# Patient Record
Sex: MALE | Race: WHITE | NOT HISPANIC OR LATINO | Employment: OTHER | URBAN - METROPOLITAN AREA
[De-identification: names, ages, dates, MRNs, and addresses within clinical notes are randomized per-mention and may not be internally consistent; named-entity substitution may affect disease eponyms.]

---

## 2018-07-09 ENCOUNTER — OFFICE VISIT (OUTPATIENT)
Dept: UROLOGY | Facility: MEDICAL CENTER | Age: 68
End: 2018-07-09
Payer: MEDICARE

## 2018-07-09 VITALS
HEIGHT: 71 IN | BODY MASS INDEX: 32.48 KG/M2 | WEIGHT: 232 LBS | DIASTOLIC BLOOD PRESSURE: 76 MMHG | SYSTOLIC BLOOD PRESSURE: 114 MMHG

## 2018-07-09 DIAGNOSIS — R39.15 BENIGN PROSTATIC HYPERPLASIA (BPH) WITH URINARY URGENCY: ICD-10-CM

## 2018-07-09 DIAGNOSIS — N32.81 OVERACTIVE BLADDER: Primary | ICD-10-CM

## 2018-07-09 DIAGNOSIS — N40.1 BENIGN PROSTATIC HYPERPLASIA (BPH) WITH URINARY URGENCY: ICD-10-CM

## 2018-07-09 LAB
SL AMB  POCT GLUCOSE, UA: NEGATIVE
SL AMB LEUKOCYTE ESTERASE,UA: NORMAL
SL AMB POCT BILIRUBIN,UA: NEGATIVE
SL AMB POCT BLOOD,UA: NEGATIVE
SL AMB POCT CLARITY,UA: CLEAR
SL AMB POCT COLOR,UA: YELLOW
SL AMB POCT KETONES,UA: NEGATIVE
SL AMB POCT NITRITE,UA: NEGATIVE
SL AMB POCT PH,UA: 7
SL AMB POCT SPECIFIC GRAVITY,UA: 1.01
SL AMB POCT URINE PROTEIN: NEGATIVE
SL AMB POCT UROBILINOGEN: 0.2

## 2018-07-09 PROCEDURE — 99214 OFFICE O/P EST MOD 30 MIN: CPT | Performed by: UROLOGY

## 2018-07-09 PROCEDURE — 81003 URINALYSIS AUTO W/O SCOPE: CPT | Performed by: UROLOGY

## 2018-07-09 RX ORDER — TERAZOSIN 2 MG/1
1 CAPSULE ORAL DAILY
COMMUNITY
Start: 2017-07-10

## 2018-07-09 RX ORDER — SIMVASTATIN 20 MG
TABLET ORAL DAILY
COMMUNITY
Start: 2018-06-12

## 2018-07-09 RX ORDER — OXYBUTYNIN CHLORIDE 5 MG/1
5 TABLET ORAL 3 TIMES DAILY
Qty: 90 TABLET | Refills: 3 | Status: SHIPPED | OUTPATIENT
Start: 2018-07-09 | End: 2018-10-05 | Stop reason: SDUPTHER

## 2018-07-09 RX ORDER — DIPHENOXYLATE HYDROCHLORIDE AND ATROPINE SULFATE 2.5; .025 MG/1; MG/1
1 TABLET ORAL DAILY
COMMUNITY

## 2018-07-09 NOTE — PROGRESS NOTES
Assessment/Plan:    Overactive bladder    We will try a higher dose of oxybutynin to see if this will relieve his voiding symptoms  Diagnoses and all orders for this visit:    Overactive bladder  -     oxybutynin (DITROPAN) 5 mg tablet; Take 1 tablet (5 mg total) by mouth 3 (three) times a day    Benign prostatic hyperplasia (BPH) with urinary urgency  -     POCT urine dip auto non-scope  -     PSA, Total Screen; Future    Other orders  -     simvastatin (ZOCOR) 20 mg tablet; daily  -     terazosin (HYTRIN) 2 mg capsule; Take 1 capsule by mouth daily  -     Omega-3 Fatty Acids (FISH OIL PO); Take by mouth  -     multivitamin (THERAGRAN) TABS; Take 1 tablet by mouth daily  -     psyllium (METAMUCIL) 58 6 % packet; Take 1 packet by mouth daily  -     IRON PO; Take by mouth as needed          Subjective:      Patient ID: Luz Catherine is a 77 y o  male  HPI  BPH:   He notes urinary urgency, nocturia x 3  He denies other significant urinary symptoms  He denies hematuria or urinary tract infections  He is taking  oxybutynin for his symptoms  OAB:  Cysto in June 2017 showed mild prostatic obstruction and no bladder changes of obstruction  The patient tried oxybutynin 5 mg in the past without much relief  We will try it again at a higher dose  PSA on March 13, 2018 was 2 07  The following portions of the patient's history were reviewed and updated as appropriate: allergies, current medications, past family history, past medical history, past social history, past surgical history and problem list     Review of Systems   Constitutional: Negative for activity change and fatigue  Respiratory: Negative for shortness of breath and wheezing  Cardiovascular: Negative for chest pain  Gastrointestinal: Negative for abdominal pain  Genitourinary: Negative for difficulty urinating, dysuria, frequency, hematuria and urgency  Musculoskeletal: Negative for back pain and gait problem     Skin: Negative  Allergic/Immunologic: Negative  Neurological: Negative  Psychiatric/Behavioral: Negative  Objective:      /76 (BP Location: Left arm, Patient Position: Sitting, Cuff Size: Standard)   Ht 5' 11" (1 803 m)   Wt 105 kg (232 lb)   BMI 32 36 kg/m²          Physical Exam   Constitutional: He is oriented to person, place, and time  He appears well-developed and well-nourished  HENT:   Head: Normocephalic and atraumatic  Eyes: EOM are normal    Neck: Normal range of motion  Neck supple  Pulmonary/Chest: Effort normal    Genitourinary: Rectum normal    Genitourinary Comments: Prostate about 40 gm, smooth and firm  Musculoskeletal: Normal range of motion  Neurological: He is alert and oriented to person, place, and time  Skin: Skin is warm and dry  Psychiatric: He has a normal mood and affect   His behavior is normal  Judgment and thought content normal

## 2018-07-09 NOTE — LETTER
July 9, 2018     [address] [place] [phone]    Patient: Rosy Schmid   YOB: 1950   Date of Visit: 7/9/2018       Dear Dr Deandre Lomax: Thank you for referring Rosy Schmid to me for evaluation  Below are my notes for this consultation  If you have questions, please do not hesitate to call me  I look forward to following your patient along with you  Sincerely,        Guillermo Chin MD        CC: No Recipients  Guillermo Chin MD  7/9/2018  3:27 PM  Sign at close encounter  Assessment/Plan:    Overactive bladder    We will try a higher dose of oxybutynin to see if this will relieve his voiding symptoms  Diagnoses and all orders for this visit:    Overactive bladder  -     oxybutynin (DITROPAN) 5 mg tablet; Take 1 tablet (5 mg total) by mouth 3 (three) times a day    Benign prostatic hyperplasia (BPH) with urinary urgency  -     POCT urine dip auto non-scope  -     PSA, Total Screen; Future    Other orders  -     simvastatin (ZOCOR) 20 mg tablet; daily  -     terazosin (HYTRIN) 2 mg capsule; Take 1 capsule by mouth daily  -     Omega-3 Fatty Acids (FISH OIL PO); Take by mouth  -     multivitamin (THERAGRAN) TABS; Take 1 tablet by mouth daily  -     psyllium (METAMUCIL) 58 6 % packet; Take 1 packet by mouth daily  -     IRON PO; Take by mouth as needed          Subjective:      Patient ID: Rosy Schmid is a 77 y o  male  HPI  BPH:   He notes urinary urgency, nocturia x 3  He denies other significant urinary symptoms  He denies hematuria or urinary tract infections  He is taking  oxybutynin for his symptoms  OAB:  Cysto in June 2017 showed mild prostatic obstruction and no bladder changes of obstruction  The patient tried oxybutynin 5 mg in the past without much relief  We will try it again at a higher dose  PSA on March 13, 2018 was 2 07        The following portions of the patient's history were reviewed and updated as appropriate: allergies, current medications, past family history, past medical history, past social history, past surgical history and problem list     Review of Systems   Constitutional: Negative for activity change and fatigue  Respiratory: Negative for shortness of breath and wheezing  Cardiovascular: Negative for chest pain  Gastrointestinal: Negative for abdominal pain  Genitourinary: Negative for difficulty urinating, dysuria, frequency, hematuria and urgency  Musculoskeletal: Negative for back pain and gait problem  Skin: Negative  Allergic/Immunologic: Negative  Neurological: Negative  Psychiatric/Behavioral: Negative  Objective:      /76 (BP Location: Left arm, Patient Position: Sitting, Cuff Size: Standard)   Ht 5' 11" (1 803 m)   Wt 105 kg (232 lb)   BMI 32 36 kg/m²           Physical Exam   Constitutional: He is oriented to person, place, and time  He appears well-developed and well-nourished  HENT:   Head: Normocephalic and atraumatic  Eyes: EOM are normal    Neck: Normal range of motion  Neck supple  Pulmonary/Chest: Effort normal    Genitourinary: Rectum normal    Genitourinary Comments: Prostate about 40 gm, smooth and firm  Musculoskeletal: Normal range of motion  Neurological: He is alert and oriented to person, place, and time  Skin: Skin is warm and dry  Psychiatric: He has a normal mood and affect   His behavior is normal  Judgment and thought content normal

## 2018-07-09 NOTE — PROGRESS NOTES
IPSS Questionnaire (AUA-7): Over the past month    1)  How often have you had a sensation of not emptying your bladder completely after you finish urinating? 2 - Less than half the time   2)  How often have you had to urinate again less than two hours after you finished urinating? 4 - More than half the time   3)  How often have you found you stopped and started again several times when you urinated? 1 - Less than 1 time in 5   4) How difficult have you found it to postpone urination? 5 - Almost always   5) How often have you had a weak urinary stream?  1 - Less than 1 time in 5   6) How often have you had to push or strain to begin urination? 1 - Less than 1 time in 5   7) How many times did you most typically get up to urinate from the time you went to bed until the time you got up in the morning? 3 - 3 times   Total Score:  17     QOL: Unhappy

## 2018-10-05 DIAGNOSIS — N32.81 OVERACTIVE BLADDER: ICD-10-CM

## 2018-10-05 NOTE — TELEPHONE ENCOUNTER
Patient called requesting refill on Oxybutynin IR 5mg  Patient working successfully with this medication but he only really takes two pills per day, NOT the previously ordered TID dosing    Request for same, 90 day supply (180 tablets) with 3 refills was queued and forwarded to Dr Rut Rodas for approval

## 2018-10-06 RX ORDER — OXYBUTYNIN CHLORIDE 5 MG/1
5 TABLET ORAL 2 TIMES DAILY
Qty: 180 TABLET | Refills: 3 | Status: SHIPPED | OUTPATIENT
Start: 2018-10-06 | End: 2020-03-26

## 2019-07-11 DIAGNOSIS — R39.15 BENIGN PROSTATIC HYPERPLASIA (BPH) WITH URINARY URGENCY: Primary | ICD-10-CM

## 2019-07-11 DIAGNOSIS — N40.1 BENIGN PROSTATIC HYPERPLASIA (BPH) WITH URINARY URGENCY: Primary | ICD-10-CM

## 2020-03-25 DIAGNOSIS — N32.81 OVERACTIVE BLADDER: ICD-10-CM

## 2020-03-26 RX ORDER — OXYBUTYNIN CHLORIDE 5 MG/1
TABLET ORAL
Qty: 180 TABLET | Refills: 3 | Status: SHIPPED | OUTPATIENT
Start: 2020-03-26

## 2020-10-05 ENCOUNTER — TELEPHONE (OUTPATIENT)
Dept: INTERNAL MEDICINE CLINIC | Facility: CLINIC | Age: 70
End: 2020-10-05

## 2022-06-17 ENCOUNTER — CONSULT (OUTPATIENT)
Dept: PULMONOLOGY | Facility: CLINIC | Age: 72
End: 2022-06-17
Payer: MEDICARE

## 2022-06-17 VITALS
OXYGEN SATURATION: 95 % | TEMPERATURE: 98.3 F | HEIGHT: 71 IN | HEART RATE: 92 BPM | BODY MASS INDEX: 33.18 KG/M2 | DIASTOLIC BLOOD PRESSURE: 70 MMHG | WEIGHT: 237 LBS | SYSTOLIC BLOOD PRESSURE: 124 MMHG

## 2022-06-17 DIAGNOSIS — J45.40 MODERATE PERSISTENT ASTHMA WITHOUT COMPLICATION: Primary | ICD-10-CM

## 2022-06-17 DIAGNOSIS — J30.89 NON-SEASONAL ALLERGIC RHINITIS, UNSPECIFIED TRIGGER: ICD-10-CM

## 2022-06-17 DIAGNOSIS — E66.09 CLASS 1 OBESITY DUE TO EXCESS CALORIES WITHOUT SERIOUS COMORBIDITY WITH BODY MASS INDEX (BMI) OF 33.0 TO 33.9 IN ADULT: ICD-10-CM

## 2022-06-17 DIAGNOSIS — R05.9 COUGH: ICD-10-CM

## 2022-06-17 PROCEDURE — 99204 OFFICE O/P NEW MOD 45 MIN: CPT | Performed by: INTERNAL MEDICINE

## 2022-06-17 RX ORDER — BUDESONIDE 0.5 MG/2ML
0.5 INHALANT ORAL 2 TIMES DAILY
Qty: 120 ML | Refills: 5 | Status: SHIPPED | OUTPATIENT
Start: 2022-06-17 | End: 2022-06-20 | Stop reason: SDUPTHER

## 2022-06-17 RX ORDER — BUDESONIDE AND FORMOTEROL FUMARATE DIHYDRATE 80; 4.5 UG/1; UG/1
2 AEROSOL RESPIRATORY (INHALATION) 2 TIMES DAILY
COMMUNITY

## 2022-06-17 RX ORDER — ALBUTEROL SULFATE 90 UG/1
2 AEROSOL, METERED RESPIRATORY (INHALATION) EVERY 6 HOURS PRN
COMMUNITY

## 2022-06-17 RX ORDER — ALBUTEROL SULFATE 2.5 MG/3ML
2.5 SOLUTION RESPIRATORY (INHALATION) EVERY 6 HOURS PRN
Qty: 360 ML | Refills: 5 | Status: SHIPPED | OUTPATIENT
Start: 2022-06-17 | End: 2022-06-20 | Stop reason: SDUPTHER

## 2022-06-17 NOTE — PROGRESS NOTES
Pulmonary Consultation   Reanna Jackson 74 y o  male MRN: [id number]    Encounter: [id number]      Reason for consultation:   Cough    Requesting physician:  Kristal Shearer DO       Impressions:   · Bronchial asthma  · Cough  · Allergic rhinitis  · Obesity  Recommendations:  · Nebulizer machine  · Budesonide 0 5 mg nebulized twice a day  · Albuterol nebulizer treatments 4 times a day as needed  · Hold the Symbicort  · Follow-up in 2 months  Discussion:  The patient's cough is due to uncontrolled asthma  His pulmonary function test done at [facility] shows evidence of bronchial asthma with reversibility to the bronchodilators  His cough is not allowing him to use the Symbicort appropriately as he cannot hold his breath  For this reason I have started him on budesonide 0 5 mg nebulized twice a day  I have ordered a nebulizer machine  Also ordered albuterol solution for the nebulizer to be used 4 times a day as needed  I told him to hold the Symbicort what he is on the nebulizer treatment  After 2 weeks if his cough is controlled he may resume the Symbicort  I told him to use 2 inhalations twice a day  I have provided him with a spacer so he can use it with the Symbicort inhaler for better efficiency  I will see him in about 2 months  History of Present Illness   HPI:  Reanna Jackson is a 74 y o  male who is here for evaluation of cough  The patient stated that he had a chronic cough which has worsened over time  He was evaluated in the past by [facility]  He was placed on Symbicort which he has been taking twice a day  It is 80/4 5  He had episodes of severe cough to the point where he passed out  The patient is fairly active  He mowed the lawn for 3 hours earlier today in the morning  Denies chest pain  Denies heartburn or postnasal dripping      Review of systems:  12 point review of systems was completed and was otherwise negative except as listed in HPI  Historical Information   Past Medical History:   Diagnosis Date    Abdominal pain     Anemia     Bladder trabeculation     BPH with obstruction/lower urinary tract symptoms     Elevated PSA     Kidney stone     Urinary incontinence      Past Surgical History:   Procedure Laterality Date    CYSTOSCOPY  ,     CYSTOSCOPY W/ STONE MANIPULATION  ,     PROSTATE SURGERY      SKIN BIOPSY  2016, 2018     Family History   Problem Relation Age of Onset    Lung cancer Father        Family History:  His father  of lung cancer  He was a heavy smoker  Social History:  The patient is  and lives with his wife  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 [employer]  Lifelong nonsmoker  Meds/Allergies   No current facility-administered medications for this visit  (Not in a hospital admission)    Allergies   Allergen Reactions    Advil [Ibuprofen]        Vitals: Blood pressure 124/70, pulse 92, temperature 98 3 °F (36 8 °C), temperature source Tympanic, height 5' 11" (1 803 m), weight 108 kg (237 lb), SpO2 95 %  ,      Physical exam:        Head/eyes:    Normocephalic, without obvious abnormality, atraumatic,         PERRL, extraocular muscles intact, no scleral icterus    Nose:   Nares normal, septum midline, mucosa normal, no drainage    or sinus tenderness   Throat:   Moist mucous membranes, no thrush   Neck:   Supple, trachea midline, no adenopathy; no carotid    bruit or JVD   Lungs:     Clear breath sounds  No wheezing or rhonchi          Heart:    Regular rate and rhythm, S1 and S2 normal, no murmur, rub   or gallop   Abdomen:     Soft, non-tender, bowel sounds active all four quadrants,     no masses, no organomegaly   Extremities:   Extremities normal, atraumatic, no cyanosis or edema   Skin:   Warm, dry, turgor normal, no rashes or lesions   Neurologic:   CNII-XII intact, normal strength, non-focal             Imaging and other studies: I have personally reviewed pertinent reports  Chest x-ray report from the [facility] is reviewed  No acute pulmonary findings  PFTs done in the past at [facility]   The report indicates that the patient had moderate airflow limitation with significant improvement to the bronchodilators  Ref Range & Units 6/3/22 10:50 AM    Hemoglobin 12 5 - 17 0 g/dL 13 4    Hematocrit 37 0 - 48 0 % 40 4    WBC 4 0 - 10 5 thou/cmm 4 7    RBC 4 00 - 5 40 mill/cmm 4 34    Platelet Count 429 - 350 thou/cmm 207    MPV 7 5 - 11 3 fL 9 1    MCV 80 - 100 fL 93    MCH 27 0 - 36 0 pg 30 9    MCHC 32 0 - 37 0 g/dL 33 1    RDW 12 0 - 16 0 % 14 1    Differential Type  AUTO    Absolute Neutrophils 1 8 - 7 8 thou/cmm 2 9    Absolute Lymphocytes 1 0 - 3 0 thou/cmm 0 8 Low     Absolute Monocytes 0 3 - 1 0 thou/cmm 0 7    Absolute Eosinophils 0 0 - 0 5 thou/cmm 0 2    Absolute Basophils 0 0 - 0 1 thou/cmm 0 0    Neutrophils % 62    Lymphocytes % 17    Monocytes % 15    Eosinophils % 5    Basophils % 1      Ref Range & Units 4/15/22  9:12 AM    Glucose 65 - 99 mg/dL 111 High     BUN 7 - 28 mg/dL 14    Creatinine 0 53 - 1 30 mg/dL 1 04    Sodium 135 - 145 mmol/L 139    Potassium 3 5 - 5 2 mmol/L 4 6    Chloride 100 - 109 mmol/L 105    Carbon Dioxide 23 - 31 mmol/L 27    Calcium 8 5 - 10 1 mg/dL 9 2    Alkaline Phosphatase 35 - 120 U/L 46    Albumin 3 5 - 4 8 g/dL 3 7    Bilirubin, Total 0 2 - 1 0 mg/dL 0 5    Comment: Use of this assay is not recommended for patients undergoing treatment with eltrombopag due to the potential for falsely elevated results     Protein, Total 6 3 - 8 3 g/dL 6 6    AST <41 U/L 16    ALT <56 U/L 26    Anion Gap 3 - 11 7    eGFRcr >59 75              Génesis Lindsey MD

## 2022-06-18 ENCOUNTER — PATIENT MESSAGE (OUTPATIENT)
Dept: PULMONOLOGY | Facility: CLINIC | Age: 72
End: 2022-06-18

## 2022-06-18 DIAGNOSIS — J45.40 MODERATE PERSISTENT ASTHMA WITHOUT COMPLICATION: ICD-10-CM

## 2022-06-20 ENCOUNTER — TELEPHONE (OUTPATIENT)
Dept: PULMONOLOGY | Facility: CLINIC | Age: 72
End: 2022-06-20

## 2022-06-20 RX ORDER — ALBUTEROL SULFATE 2.5 MG/3ML
2.5 SOLUTION RESPIRATORY (INHALATION) EVERY 6 HOURS PRN
Qty: 360 ML | Refills: 5 | Status: SHIPPED | OUTPATIENT
Start: 2022-06-20 | End: 2022-07-20

## 2022-06-20 RX ORDER — BUDESONIDE 0.5 MG/2ML
0.5 INHALANT ORAL 2 TIMES DAILY
Qty: 120 ML | Refills: 5 | Status: SHIPPED | OUTPATIENT
Start: 2022-06-20 | End: 2022-07-18

## 2022-06-20 NOTE — TELEPHONE ENCOUNTER
Pt was given a Tammi nebulizer SN# [id number] and paperwork was faxed to [facility], along w/ script and office notes of 6/17/22

## 2022-06-21 ENCOUNTER — DOCUMENTATION (OUTPATIENT)
Dept: PULMONOLOGY | Facility: CLINIC | Age: 72
End: 2022-06-21

## 2022-06-21 NOTE — PROGRESS NOTES
Sending disc to [facility] to be uploaded to PACs      05/03/22 - XR chest 2 views (PA & LAT)

## 2022-07-12 DIAGNOSIS — J45.40 MODERATE PERSISTENT ASTHMA WITHOUT COMPLICATION: ICD-10-CM

## 2022-07-14 ENCOUNTER — TELEPHONE (OUTPATIENT)
Dept: PULMONOLOGY | Facility: CLINIC | Age: 72
End: 2022-07-14

## 2022-07-14 NOTE — TELEPHONE ENCOUNTER
LM for patient to give a call back and set up a follow up Appt for Dr Marguerite Reed in  [facility]

## 2022-07-18 RX ORDER — BUDESONIDE 0.5 MG/2ML
0.5 INHALANT ORAL 2 TIMES DAILY
Qty: 360 ML | Refills: 2 | Status: SHIPPED | OUTPATIENT
Start: 2022-07-18 | End: 2022-08-17

## 2022-09-28 ENCOUNTER — OFFICE VISIT (OUTPATIENT)
Dept: PULMONOLOGY | Facility: CLINIC | Age: 72
End: 2022-09-28
Payer: MEDICARE

## 2022-09-28 VITALS
HEIGHT: 71 IN | SYSTOLIC BLOOD PRESSURE: 122 MMHG | DIASTOLIC BLOOD PRESSURE: 70 MMHG | BODY MASS INDEX: 33.04 KG/M2 | WEIGHT: 236 LBS | OXYGEN SATURATION: 96 % | HEART RATE: 90 BPM | RESPIRATION RATE: 18 BRPM

## 2022-09-28 DIAGNOSIS — E66.09 CLASS 1 OBESITY DUE TO EXCESS CALORIES WITHOUT SERIOUS COMORBIDITY WITH BODY MASS INDEX (BMI) OF 33.0 TO 33.9 IN ADULT: ICD-10-CM

## 2022-09-28 DIAGNOSIS — J30.89 NON-SEASONAL ALLERGIC RHINITIS, UNSPECIFIED TRIGGER: ICD-10-CM

## 2022-09-28 DIAGNOSIS — R05.9 COUGH: ICD-10-CM

## 2022-09-28 DIAGNOSIS — U07.1 COVID: ICD-10-CM

## 2022-09-28 DIAGNOSIS — J45.40 MODERATE PERSISTENT ASTHMA WITHOUT COMPLICATION: Primary | ICD-10-CM

## 2022-09-28 PROCEDURE — 99214 OFFICE O/P EST MOD 30 MIN: CPT | Performed by: INTERNAL MEDICINE

## 2022-09-28 RX ORDER — FLUTICASONE PROPIONATE 50 MCG
1 SPRAY, SUSPENSION (ML) NASAL DAILY
Qty: 16 G | Refills: 5 | Status: SHIPPED | OUTPATIENT
Start: 2022-09-28

## 2022-09-28 RX ORDER — BUDESONIDE AND FORMOTEROL FUMARATE DIHYDRATE 80; 4.5 UG/1; UG/1
2 AEROSOL RESPIRATORY (INHALATION) 2 TIMES DAILY
Qty: 10.2 G | Refills: 5 | Status: SHIPPED | OUTPATIENT
Start: 2022-09-28

## 2022-09-28 NOTE — PROGRESS NOTES
Office Progress Note - Pulmonary    Reanna Henry 74 y o  male MRN: [id number]    Encounter: [id number]      Assessment:   Bronchial asthma   Cough  Resolved   Allergic rhinitis   Recent COVID infection   Obesity  Plan:     Hold the budesonide nebulizer treatments   Symbicort 80/4 5, 2 inhalations twice a day   Albuterol rescue inhaler 2 inhalations 4 times a day as needed   Fluticasone nasal spray 2 sprays to each nostril once a day   Follow-up in 6 months  Discussion:   The patient's cough has resolved  His asthma is well controlled  I have transitioned him back to Symbicort 80/4 5, 2 inhalations twice a day  I have renewed his prescriptions  He will use the albuterol rescue inhaler 4 times a day as needed  He has symptomatic postnasal dripping from allergic rhinitis  I have started him on fluticasone nasal spray 2 sprays to each nostril once a day  I will see him in 6 months in a follow-up visit  Subjective: The patient is here for a follow-up visit  His cough has resolved  He is using budesonide nebulizer treatment once a day  He was away and use the Symbicort  He has no nocturnal symptoms  Review of systems:  A 12 point system review is done and aside from what is stated above the rest of the review of systems is negative  Family history and social history are reviewed  Medications list is reviewed  Vitals: Blood pressure 122/70, pulse 90, resp  rate 18, height 5' 11" (1 803 m), weight 107 kg (236 lb), SpO2 96 %  ,     Physical Exam  Gen: Awake, alert, oriented x 3, no acute distress  HEENT: Mucous membranes moist, no oral lesions, no thrush  NECK: No accessory muscle use, JVP not elevated  Cardiac: Regular, single S1, single S2, no murmurs, no rubs, no gallops  Lungs:  Clear breath sounds  No wheezing or rhonchi    Abdomen: normoactive bowel sounds, soft nontender, nondistended, no rebound or rigidity, no guarding  Extremities: no cyanosis, no clubbing, no edema  Neuro:  Grossly nonfocal   Skin:  No rash

## 2022-11-20 DIAGNOSIS — J30.89 NON-SEASONAL ALLERGIC RHINITIS, UNSPECIFIED TRIGGER: ICD-10-CM

## 2022-11-22 RX ORDER — FLUTICASONE PROPIONATE 50 MCG
SPRAY, SUSPENSION (ML) NASAL
Qty: 24 ML | Refills: 4 | Status: SHIPPED | OUTPATIENT
Start: 2022-11-22

## 2022-12-06 DIAGNOSIS — J30.89 NON-SEASONAL ALLERGIC RHINITIS, UNSPECIFIED TRIGGER: ICD-10-CM

## 2022-12-07 RX ORDER — FLUTICASONE PROPIONATE 50 MCG
SPRAY, SUSPENSION (ML) NASAL
Qty: 24 ML | Refills: 4 | Status: SHIPPED | OUTPATIENT
Start: 2022-12-07

## 2023-01-16 ENCOUNTER — TELEPHONE (OUTPATIENT)
Dept: PULMONOLOGY | Facility: CLINIC | Age: 73
End: 2023-01-16

## 2023-01-16 NOTE — TELEPHONE ENCOUNTER
Left a voicemail for patient to schedule a 6 month follow up at our [facility] office with Dr Bernarda Baron or TUCKER in March

## 2023-03-21 ENCOUNTER — OFFICE VISIT (OUTPATIENT)
Dept: PULMONOLOGY | Facility: CLINIC | Age: 73
End: 2023-03-21

## 2023-03-21 VITALS
DIASTOLIC BLOOD PRESSURE: 72 MMHG | OXYGEN SATURATION: 97 % | SYSTOLIC BLOOD PRESSURE: 132 MMHG | HEIGHT: 71 IN | WEIGHT: 241.8 LBS | BODY MASS INDEX: 33.85 KG/M2 | HEART RATE: 63 BPM

## 2023-03-21 DIAGNOSIS — I49.9 IRREGULAR HEART BEAT: ICD-10-CM

## 2023-03-21 DIAGNOSIS — J30.89 NON-SEASONAL ALLERGIC RHINITIS, UNSPECIFIED TRIGGER: ICD-10-CM

## 2023-03-21 DIAGNOSIS — J45.40 MODERATE PERSISTENT ASTHMA WITHOUT COMPLICATION: Primary | ICD-10-CM

## 2023-03-21 DIAGNOSIS — R06.09 DOE (DYSPNEA ON EXERTION): ICD-10-CM

## 2023-03-21 RX ORDER — FLUTICASONE PROPIONATE 50 MCG
2 SPRAY, SUSPENSION (ML) NASAL DAILY
Qty: 16 ML | Refills: 5 | Status: SHIPPED | OUTPATIENT
Start: 2023-03-21

## 2023-03-21 NOTE — PROGRESS NOTES
Office Progress Note - Pulmonary    Lina Quiñones 74 y o  male MRN: [id number]    Encounter: [id number]      Assessment:  • Dyspnea on exertion  • Irregular heartbeat  • Moderate persistent bronchial asthma  • Allergic rhinitis  Plan:   • ECG  • 6-minute walk test   • 2D echo  • Symbicort 80/4 5, 2 inhalations twice a day  • Hold the budesonide nebulizer treatments  • Albuterol rescue inhaler 2 inhalations 4 times daily as needed  • Saline nasal/sinus rinse once a day  • Fluticasone nasal spray 2 sprays to each nostril once a day  Discussion:   The patient's dyspnea on exertion is most likely due to deconditioning and obesity  His 6-minute walk test was unremarkable  I advised the patient to lose weight and start taking regular walks to improve his stamina  The irregular heartbeat is due to premature atrial complexes  He does have ejection systolic murmur and for this reason I have ordered 2D echo  His last echo from 3 years ago showed aortic valve sclerosis  His bronchial asthma is fairly controlled  I told him to use the Symbicort 80/4 5, 2 inhalations twice a day  They told him to stop using the budesonide nebulizer treatments  He will use albuterol rescue inhaler 4 times a day as needed  His allergic rhinitis is not well controlled  I have started him on saline nasal/sinus rinse once a day in the evening  He will use fluticasone nasal spray 2 sprays to each nostril once a day in the morning  I will see him in 2 months  Subjective: The patient is here for a follow-up visit  He has dyspnea on exertion which is more noticeable to him than before  He also has postnasal dripping and coughing  Denies any significant wheezing  He has been using budesonide nebulizer treatments once a day and sometimes alternating it with the Symbicort  No nocturnal symptoms  He has postnasal dripping      Review of systems:  A 12 point system review is done and aside from what is stated above the rest of the review of systems is negative  Family history and social history are reviewed  Medications list is reviewed  Vitals: Blood pressure 132/72, pulse 63, height 5' 11" (1 803 m), weight 110 kg (241 lb 12 8 oz), SpO2 97 %  ,     Physical Exam  Gen: Awake, alert, oriented x 3, no acute distress  HEENT: Mucous membranes moist, no oral lesions, no thrush  NECK: No accessory muscle use, JVP not elevated  Cardiac: Irregular rhythm  2/6 ejection systolic murmur  Lungs: Clear breath sounds  No wheezing or rhonchi  Abdomen: normoactive bowel sounds, soft nontender, nondistended, no rebound or rigidity, no guarding  Extremities: no cyanosis, no clubbing, no edema  Neuro:  Grossly nonfocal   Skin:  No rash  ECG showed sinus rhythm with PACs    6-minute walk test was done in the office today and the patient started with a heart rate of 82 bpm and O2 saturation 96%  At the end of the test the heart rate was 97 bpm and the O2 saturation 96%       Ref Range & Units 10/28/22  8:00 AM   Hemoglobin 12 5 - 17 0 g/dL 13 3    Hematocrit 37 0 - 48 0 % 39 0    WBC 4 0 - 10 5 thou/cmm 4 7    RBC 4 00 - 5 40 mill/cmm 4 18    Platelet Count 891 - 350 thou/cmm 225    MPV 7 5 - 11 3 fL 9 1    MCV 80 - 100 fL 93    MCH 27 0 - 36 0 pg 31 7    MCHC 32 0 - 37 0 g/dL 34 0    RDW 12 0 - 16 0 % 13 7    Differential Type  AUTO    Absolute Neutrophils 1 8 - 7 8 thou/cmm 2 8    Absolute Lymphocytes 1 0 - 3 0 thou/cmm 1 1    Absolute Monocytes 0 3 - 1 0 thou/cmm 0 4    Absolute Eosinophils 0 0 - 0 5 thou/cmm 0 3    Absolute Basophils 0 0 - 0 1 thou/cmm 0 1    Neutrophils % 61    Lymphocytes % 24    Monocytes % 8    Eosinophils % 6       Ref Range & Units 10/28/22  8:00 AM   Glucose 65 - 99 mg/dL 100 High     BUN 7 - 28 mg/dL 12    Creatinine 0 53 - 1 30 mg/dL 1 04    Sodium 135 - 145 mmol/L 140    Potassium 3 5 - 5 2 mmol/L 4 6    Chloride 100 - 109 mmol/L 108    Carbon Dioxide 23 - 31 mmol/L 26    Calcium 8 5 - 10 1 mg/dL 9 4    Alkaline Phosphatase 35 - 120 U/L 46    Albumin 3 5 - 4 8 g/dL 3 6    Bilirubin, Total 0 2 - 1 0 mg/dL 0 6    Comment: Use of this assay is not recommended for patients undergoing treatment with eltrombopag due to the potential for falsely elevated results     Protein, Total 6 3 - 8 3 g/dL 6 4    AST <41 U/L 16    ALT <56 U/L 29    Anion Gap 3 - 11 6    eGFRcr >59 75

## 2023-05-24 ENCOUNTER — OFFICE VISIT (OUTPATIENT)
Dept: PULMONOLOGY | Facility: CLINIC | Age: 73
End: 2023-05-24

## 2023-05-24 VITALS
HEIGHT: 71 IN | DIASTOLIC BLOOD PRESSURE: 74 MMHG | BODY MASS INDEX: 34.44 KG/M2 | WEIGHT: 246 LBS | HEART RATE: 66 BPM | OXYGEN SATURATION: 97 % | SYSTOLIC BLOOD PRESSURE: 126 MMHG | TEMPERATURE: 97.6 F

## 2023-05-24 DIAGNOSIS — J30.89 NON-SEASONAL ALLERGIC RHINITIS, UNSPECIFIED TRIGGER: ICD-10-CM

## 2023-05-24 DIAGNOSIS — J45.40 MODERATE PERSISTENT ASTHMA WITHOUT COMPLICATION: ICD-10-CM

## 2023-05-24 DIAGNOSIS — R06.09 DOE (DYSPNEA ON EXERTION): ICD-10-CM

## 2023-05-24 DIAGNOSIS — E66.09 CLASS 1 OBESITY DUE TO EXCESS CALORIES WITHOUT SERIOUS COMORBIDITY WITH BODY MASS INDEX (BMI) OF 33.0 TO 33.9 IN ADULT: ICD-10-CM

## 2023-05-24 DIAGNOSIS — J45.41 MODERATE PERSISTENT ASTHMA WITH ACUTE EXACERBATION: Primary | ICD-10-CM

## 2023-05-24 RX ORDER — PREDNISONE 10 MG/1
TABLET ORAL
Qty: 45 TABLET | Refills: 0 | Status: SHIPPED | OUTPATIENT
Start: 2023-05-24

## 2023-05-24 RX ORDER — BUDESONIDE 0.5 MG/2ML
0.5 INHALANT ORAL 2 TIMES DAILY
Qty: 120 ML | Refills: 5 | Status: SHIPPED | OUTPATIENT
Start: 2023-05-24 | End: 2023-06-23

## 2023-05-24 NOTE — PROGRESS NOTES
Office Progress Note - Pulmonary    Will Speed 74 y o  male MRN: [id number]    Encounter: [id number]      Assessment:  • Brhonchial asthma with acute exacerbation  • Allergic rhinitis  • Dyspnea on exertion  • Obesity  Plan:   • Prednisone 40 mg p o  daily for 5 days with a taper by 10 mg every 3 days  • Budesonide 0 5 mg nebulized twice a day  • Symbicort 80/4 5, 2 inhalations twice a day  • Albuterol rescue inhaler 2 inhalations 4 times a day as needed  • Weight loss  • Regular exercise  • Follow-up in 6 weeks  Discussion:   The patient's cough is due to asthma acute exacerbation  I have started him on prednisone 40 mg once a day for 5 days with a taper by 10 mg every 3 days  I have restarted his budesonide 0 5 mg nebulized twice a day until cough has resolved  I have maintained him on the Symbicort 80/4 5, 2 inhalations twice a day  He will use the albuterol rescue inhaler 2 inhalations 4 times a day as needed  His shortness of breath on exertion is better  This is mainly due to weight loss and regular exercise  I will encourage him to continue with the same strategy  I will see him in 6 weeks  Subjective: The patient is here for a follow-up visit  He was on a cruise and developed upper respiratory infection  He started having a cough  He was evaluated on the ship and then started on albuterol nebulizer treatments  After he returned home he continued to cough  The cough is dry  It was during the day as well as at night  He restarted the budesonide nebulizer treatments which helped his symptoms  However he still has significant cough  He started to have a regular walk and exercise  His shortness of breath has improved  He denies any chest pain  No fever or chills  Review of systems:  A 12 point system review is done and aside from what is stated above the rest of the review of systems is negative        Family history and social history are "reviewed  Medications list is reviewed  Vitals: Blood pressure 126/74, pulse 66, temperature 97 6 °F (36 4 °C), temperature source Tympanic, height 5' 11\" (1 803 m), weight 112 kg (246 lb), SpO2 97 %  ,     Physical Exam  Gen: Awake, alert, oriented x 3, no acute distress  HEENT: Mucous membranes moist, no oral lesions, no thrush  NECK: No accessory muscle use, JVP not elevated  Cardiac: Regular, single S1, single S2  2/6 ejection systolic murmur  2D echo  Lungs: Clear breath sounds  No wheezing or rhonchi  Abdomen: normoactive bowel sounds, soft nontender, nondistended, no rebound or rigidity, no guarding  Extremities: no cyanosis, no clubbing, no edema  Neuro:  Grossly nonfocal   Skin:  No rash  Ref Range & Units 5/1/23 11:00 AM   Hemoglobin 12 5 - 17 0 g/dL 10 5 Low     Hematocrit 37 0 - 48 0 % 31 7 Low     WBC 4 0 - 10 5 thou/cmm 4 9    RBC 4 00 - 5 40 mill/cmm 3 45 Low     Platelet Count 567 - 350 thou/cmm 332    MPV 7 5 - 11 3 fL 8 2    MCV 80 - 100 fL 92    MCH 27 0 - 36 0 pg 30 3    MCHC 32 0 - 37 0 g/dL 33 0    RDW 12 0 - 16 0 % 14 1    Differential Type  AUTO    Absolute Neutrophils 1 8 - 7 8 thou/cmm 3 1    Absolute Lymphocytes 1 0 - 3 0 thou/cmm 1 0    Absolute Monocytes 0 3 - 1 0 thou/cmm 0 4    Absolute Eosinophils 0 0 - 0 5 thou/cmm 0 2    Absolute Basophils 0 0 - 0 1 thou/cmm 0 1    Neutrophils % 63    Lymphocytes % 21    Monocytes % 9    Eosinophils % 5    Basophils % 2      Ref Range & Units 4/25/23  8:26 AM   Glucose 65 - 99 mg/dL 103 High     BUN 7 - 28 mg/dL 14    Creatinine 0 53 - 1 30 mg/dL 1 11    Sodium 135 - 145 mmol/L 139    Potassium 3 5 - 5 2 mmol/L 4 5    Chloride 100 - 109 mmol/L 104    Carbon Dioxide 21 - 31 mmol/L 24    Calcium 8 5 - 10 1 mg/dL 9 6    Alkaline Phosphatase 35 - 120 U/L 40    Albumin 3 5 - 5 7 g/dL 4 3    Bilirubin, Total 0 2 - 1 0 mg/dL 0 4    Comment: Eltrombopag and its metabolites may interfere with this assay causing erroneously high patient results   " Protein, Total 6 3 - 8 3 g/dL 6 7    AST <41 U/L 19    ALT <56 U/L 18    Anion Gap 3 - 11 11    eGFRcr >59 69      2D echo shows mild aortic stenosis        Answers for HPI/ROS submitted by the patient on 5/23/2023  Do you have a cough?: Yes  Do you have difficulty breathing?: Yes  Do you have shortness of breath?: Yes  Chronicity: recurrent  When did you first notice your symptoms?: more than 1 year ago  How often do your symptoms occur?: intermittently  Since you first noticed this problem, how has it changed?: unchanged  Have you had a change in appetite?: No  Do you have chest pain?: No  Do you have shortness of breath that occurs with effort or exertion?: Yes  Do you have ear congestion?: No  Do you have ear pain?: No  Do you have a fever?: No  Do you have headaches?: No  Do you have heartburn?: No  Do you have fatigue?: No  Do you have muscle pain?: No  Do you have nasal congestion?: No  Do you have shortness of breath when lying flat?: No  Do you have shortness of breath when you wake up?: No  Do you have post-nasal drip?: No  Do you have a runny nose?: Yes  Do you have sneezing?: No  Do you have a sore throat?: No  Do you have sweats?: Yes  Do you have trouble swallowing?: No  Have you experienced weight loss?: No  Which of the following makes your symptoms better?: steroid inhaler  Risk factors for lung disease: occupational exposure

## 2023-06-15 DIAGNOSIS — J45.40 MODERATE PERSISTENT ASTHMA WITHOUT COMPLICATION: ICD-10-CM

## 2023-07-19 ENCOUNTER — OFFICE VISIT (OUTPATIENT)
Dept: PULMONOLOGY | Facility: CLINIC | Age: 73
End: 2023-07-19
Payer: MEDICARE

## 2023-07-19 VITALS
BODY MASS INDEX: 32.9 KG/M2 | HEIGHT: 71 IN | SYSTOLIC BLOOD PRESSURE: 138 MMHG | HEART RATE: 68 BPM | OXYGEN SATURATION: 95 % | DIASTOLIC BLOOD PRESSURE: 78 MMHG | WEIGHT: 235 LBS

## 2023-07-19 DIAGNOSIS — R06.09 DOE (DYSPNEA ON EXERTION): ICD-10-CM

## 2023-07-19 DIAGNOSIS — J30.89 NON-SEASONAL ALLERGIC RHINITIS, UNSPECIFIED TRIGGER: ICD-10-CM

## 2023-07-19 DIAGNOSIS — E66.09 CLASS 1 OBESITY DUE TO EXCESS CALORIES WITHOUT SERIOUS COMORBIDITY WITH BODY MASS INDEX (BMI) OF 33.0 TO 33.9 IN ADULT: ICD-10-CM

## 2023-07-19 DIAGNOSIS — J45.40 MODERATE PERSISTENT ASTHMA WITHOUT COMPLICATION: Primary | ICD-10-CM

## 2023-07-19 PROCEDURE — 99214 OFFICE O/P EST MOD 30 MIN: CPT | Performed by: INTERNAL MEDICINE

## 2023-07-19 PROCEDURE — 94010 BREATHING CAPACITY TEST: CPT

## 2023-07-19 RX ORDER — FLUTICASONE FUROATE AND VILANTEROL 200; 25 UG/1; UG/1
1 POWDER RESPIRATORY (INHALATION) DAILY
Qty: 60 BLISTER | Refills: 5 | Status: SHIPPED | OUTPATIENT
Start: 2023-07-19 | End: 2023-07-19

## 2023-07-19 RX ORDER — FLUTICASONE FUROATE AND VILANTEROL 200; 25 UG/1; UG/1
1 POWDER RESPIRATORY (INHALATION) DAILY
Qty: 60 BLISTER | Refills: 5 | Status: SHIPPED | OUTPATIENT
Start: 2023-07-19 | End: 2023-08-18

## 2023-07-19 NOTE — PROGRESS NOTES
Office Progress Note - Pulmonary    Cecilia Larsen 74 y.o. male MRN: [id number]    Encounter: [id number]      Assessment:  • Bronchial asthma. • Allergic rhinitis. • Dyspnea on exertion. • Obesity. Plan:   • Spirometry. • Discontinue Symbicort. • Breo Ellipta 200/25, 1 inhalation once a day. • Hold the budesonide nebulizer treatments. • Fluticasone nasal spray 2 sprays to each nostril once a day. • Albuterol rescue inhaler 2 inhalations 4 times a day as needed. • Follow-up in 3 months    Discussion:   The patient's asthma is very well controlled. I have held the budesonide nebulizer treatments. I have transitioned him from Symbicort to Breo 200/25, 1 inhalation once a day. He will use the albuterol rescue inhaler 2 inhalations 4 times a day as needed. His allergic rhinitis is well treated. I have maintained him on the fluticasone nasal spray 2 sprays to each nostril once a day. His spirometry today showed restrictive pattern. I will see him in 3 months and a follow-up visit. Subjective: The patient is here for a follow-up visit. His cough has resolved. Denies any significant wheezing or chest tightness. He has been using the Symbicort 80/4.5, 2 inhalations twice a day and budesonide once a day. He did not need to take the prednisone that I have prescribed the last time. Review of systems:  A 12 point system review is done and aside from what is stated above the rest of the review of systems is negative. Family history and social history are reviewed. Medications list is reviewed. Vitals: Blood pressure 138/78, pulse 68, height 5' 11" (1.803 m), weight 107 kg (235 lb), SpO2 95 %. ,     Physical Exam  Gen: Awake, alert, oriented x 3, no acute distress  HEENT: Mucous membranes moist, no oral lesions, no thrush  NECK: No accessory muscle use, JVP not elevated  Cardiac: Regular, single S1, single S2, no murmurs, no rubs, no gallops  Lungs: Decreased breath sounds.   No wheezing or rhonchi. Abdomen: normoactive bowel sounds, soft nontender, nondistended, no rebound or rigidity, no guarding  Extremities: no cyanosis, no clubbing, no edema  Neuro:  Grossly nonfocal.  Skin:  No rash. Spirometry done today in the office. It showed restrictive pattern. No airflow limitation.      Ref Range & Units 7/1/23  8:27 AM   Hemoglobin 12.5 - 17.0 g/dL 13.4    Hematocrit 37.0 - 48.0 % 40.3    WBC 4.0 - 10.5 thou/cmm 4.7    RBC 4.00 - 5.40 mill/cmm 4.46    Platelet Count 148 - 350 thou/cmm 220    MPV 7.5 - 11.3 fL 8.5    MCV 80 - 100 fL 91    MCH 27.0 - 36.0 pg 30.0    MCHC 32.0 - 37.0 g/dL 33.2    RDW 12.0 - 16.0 % 17.3 High     Differential Type  AUTO    Absolute Neutrophils 1.8 - 7.8 thou/cmm 2.9    Absolute Lymphocytes 1.0 - 3.0 thou/cmm 1.2    Absolute Monocytes 0.3 - 1.0 thou/cmm 0.4    Absolute Eosinophils 0.0 - 0.5 thou/cmm 0.3    Absolute Basophils 0.0 - 0.1 thou/cmm 0.0    Neutrophils % 60    Lymphocytes % 24    Monocytes % 8    Eosinophils % 7    Basophils % 1

## 2023-08-01 RX ORDER — BUDESONIDE 0.5 MG/2ML
INHALANT ORAL
Qty: 360 ML | Refills: 2 | Status: SHIPPED | OUTPATIENT
Start: 2023-08-01

## 2023-09-19 PROBLEM — Z98.890 HX OF COLONOSCOPY: Status: ACTIVE | Noted: 2023-08-16

## 2023-10-05 ENCOUNTER — OFFICE VISIT (OUTPATIENT)
Dept: PULMONOLOGY | Facility: CLINIC | Age: 73
End: 2023-10-05
Payer: MEDICARE

## 2023-10-05 VITALS
SYSTOLIC BLOOD PRESSURE: 122 MMHG | WEIGHT: 236.6 LBS | OXYGEN SATURATION: 95 % | RESPIRATION RATE: 18 BRPM | HEIGHT: 71 IN | BODY MASS INDEX: 33.12 KG/M2 | DIASTOLIC BLOOD PRESSURE: 74 MMHG | HEART RATE: 64 BPM

## 2023-10-05 DIAGNOSIS — J30.89 SEASONAL ALLERGIC RHINITIS DUE TO OTHER ALLERGIC TRIGGER: Primary | ICD-10-CM

## 2023-10-05 DIAGNOSIS — E66.9 CLASS 1 OBESITY WITHOUT SERIOUS COMORBIDITY WITH BODY MASS INDEX (BMI) OF 33.0 TO 33.9 IN ADULT, UNSPECIFIED OBESITY TYPE: ICD-10-CM

## 2023-10-05 DIAGNOSIS — J45.20 MILD INTERMITTENT ASTHMA WITHOUT COMPLICATION: ICD-10-CM

## 2023-10-05 PROBLEM — E66.811 CLASS 1 OBESITY WITHOUT SERIOUS COMORBIDITY WITH BODY MASS INDEX (BMI) OF 33.0 TO 33.9 IN ADULT: Status: ACTIVE | Noted: 2023-10-05

## 2023-10-05 PROBLEM — J30.9 ALLERGIC RHINITIS: Status: ACTIVE | Noted: 2023-10-05

## 2023-10-05 PROCEDURE — 99214 OFFICE O/P EST MOD 30 MIN: CPT | Performed by: INTERNAL MEDICINE

## 2023-10-05 NOTE — PROGRESS NOTES
Pulmonary Follow Up Note   Mercedes Kruse 74 y.o. male MRN: [id number]  10/5/2023      Assessment:  Bronchial asthma. Allergic rhinitis. Dyspnea on exertion. Obesity. Plan:  · Continue Symbicort as needed as per IVAN asthma guidelines as his asthma is mild  · Continue budesonide nebs as needed for SOB not relieved with albuterol rescue inhaler  · Continue Fluticasone nasal spray daily  · Can use albuterol rescue inhaler as needed for SOB q6h  · If during winter season he starts to develop more symptoms, he will go back to using symbicort scheduled twice daily     Return in about 6 months (around 4/5/2024) for Next scheduled follow up. History of Present Illness   HPI:  Mercedes Kruse is a 74 y.o. male with a PMH of asthma, allergic rhinitis who presents with follow up. Using symbicort and budesonide nebs as needed. He was unable to afford Alessia Buerger so he has not picked that up from pharmacy. He noticed he hasn't had much SOB so he has been using intermittently. Allergies are well controlled. Using flonase. Did have a cold a few weeks ago in which he used symbicort regularly for a few days prophylactic to avoid asthma exacerbation. Review of Systems   Constitutional: Negative for activity change, appetite change, chills, diaphoresis, fatigue and fever. HENT: Negative for congestion, ear pain, postnasal drip, rhinorrhea, sinus pressure, sinus pain, sneezing, sore throat and trouble swallowing. Eyes: Negative. Respiratory: Negative for cough, chest tightness and shortness of breath. Cardiovascular: Negative for chest pain, palpitations and leg swelling. Gastrointestinal: Negative for abdominal distention, abdominal pain, constipation, diarrhea, nausea and vomiting. Endocrine: Negative. Genitourinary: Negative. Musculoskeletal: Negative for back pain, gait problem, myalgias and neck pain. Skin: Negative. Allergic/Immunologic: Negative.     Neurological: Negative for dizziness, syncope, weakness, light-headedness and headaches. Hematological: Negative. Psychiatric/Behavioral: Negative. All other systems reviewed and are negative. Historical Information   Past Medical History:   Diagnosis Date   • Abdominal pain    • Anemia    • Asthma    • Bladder trabeculation    • BPH with obstruction/lower urinary tract symptoms    • Bronchiectasis (HCC)    • COPD (chronic obstructive pulmonary disease) (HCC)    • Elevated PSA    • Kidney stone    • Urinary incontinence      Past Surgical History:   Procedure Laterality Date   • COLONOSCOPY  08/03/2010    LEONARD Daugherty MD.- Diverticulosis, hemorrhoids. • COLONOSCOPY W/ BIOPSIES  04/27/2000    LEONARD Hurst MD.-Diverticulosis, hemorrhoids, and mild proctitis. Bx: coloinic mucosa with no diagnostic abnormality. • CYSTOSCOPY  1980, 2003   • [facility], 2003   • EGD  10/23/2012    LEONARD Daugherty MD.-Gastritis, Schatzki ring. Bx: Neg. for H. pylori. • EGD AND COLONOSCOPY  04/29/2016    LEONARD Daugherty MD.-2cm hiatus hernia, non-obstructing Schatzki ring, erythematous mucosa in antrum, normal duodenum./Diverticulosis in descending colon, in transverse colon and in ascending colon, erythemautous, hemorrhagic and inflamed mucosa in descending colon. Bx: neg. for sprue, neg. for H. pylori, neg. for features of colitis. • EGD AND COLONOSCOPY  09/22/1995    LEONARD Daugherty MD.-Mid-epigastric pain, Diverticulosis. • FLEXIBLE SIGMOIDOSCOPY  03/12/2013    LEONARD Daugherty MD.-Hemorrhoids. Bx: neg. for features of colitis.    • PROSTATE SURGERY     • SKIN BIOPSY  06/2016, 03/2018     Family History   Problem Relation Age of Onset   • Lung cancer Father          Meds/Allergies     Current Outpatient Medications:   •  albuterol (PROVENTIL HFA,VENTOLIN HFA) 90 mcg/act inhaler, Inhale 2 puffs every 6 (six) hours as needed for wheezing, Disp: , Rfl:   •  budesonide (PULMICORT) 0.5 mg/2 mL nebulizer solution, TAKE 2 ML (0.5 MG TOTAL) BY NEBULIZATION TWICE A DAY RINSE MOUTH AFTER USE (Patient taking differently: if needed), Disp: 360 mL, Rfl: 2  •  fluticasone (FLONASE) 50 mcg/act nasal spray, SPRAY 2 SPRAYS INTO EACH NOSTRIL EVERY DAY, Disp: 48 mL, Rfl: 2  •  fluticasone-vilanterol (Breo Ellipta) 200-25 mcg/actuation inhaler, Inhale 1 puff daily Rinse mouth after use. (Patient taking differently: Inhale 1 puff if needed Rinse mouth after use.), Disp: 60 blister, Rfl: 5  •  IRON PO, Take by mouth daily, Disp: , Rfl:   •  multivitamin (THERAGRAN) TABS, Take 1 tablet by mouth daily, Disp: , Rfl:   •  Omega-3 Fatty Acids (FISH OIL PO), Take by mouth, Disp: , Rfl:   •  psyllium (METAMUCIL) 58.6 % packet, Take 1 packet by mouth daily, Disp: , Rfl:   •  simvastatin (ZOCOR) 20 mg tablet, daily, Disp: , Rfl:   Allergies   Allergen Reactions   • Advil [Ibuprofen]    • Codeine Hallucinations       Vitals: Blood pressure 122/74, pulse 64, resp. rate 18, height 5' 11" (1.803 m), weight 107 kg (236 lb 9.6 oz), SpO2 95 %. Body mass index is 33 kg/m². Oxygen Therapy  SpO2: 95 %  Oxygen Therapy: None (Room air)      Physical Exam  Physical Exam  Vitals and nursing note reviewed. Constitutional:       Appearance: Normal appearance. He is obese. HENT:      Head: Normocephalic and atraumatic. Right Ear: External ear normal.      Left Ear: External ear normal.      Nose: Nose normal.      Mouth/Throat:      Mouth: Mucous membranes are moist.      Pharynx: Oropharynx is clear. Eyes:      Conjunctiva/sclera: Conjunctivae normal.   Cardiovascular:      Rate and Rhythm: Normal rate and regular rhythm. Pulses: Normal pulses. Heart sounds: Normal heart sounds. Pulmonary:      Effort: Pulmonary effort is normal.      Breath sounds: Normal breath sounds. Abdominal:      General: Abdomen is flat. Bowel sounds are normal.      Palpations: Abdomen is soft. Musculoskeletal:         General: No swelling or tenderness. Cervical back: Neck supple. No muscular tenderness. Skin:     General: Skin is warm and dry. Capillary Refill: Capillary refill takes less than 2 seconds. Neurological:      Mental Status: He is alert and oriented to person, place, and time. Mental status is at baseline. Psychiatric:         Mood and Affect: Mood normal.         Behavior: Behavior normal.         Thought Content: Thought content normal.         Judgment: Judgment normal.         Preventative: Influenza vaccine: getting next wed  Pneumonia vaccine: completed     Labs: I have personally reviewed pertinent lab results. No results found for: "WBC", "HGB", "HCT", "MCV", "PLT"  No results found for: "GLUCOSE", "CALCIUM", "NA", "K", "CO2", "CL", "BUN", "CREATININE"  No results found for: "IGE"  No results found for: "ALT", "AST", "GGT", "ALKPHOS", "BILITOT"      Imaging and other studies: I have personally reviewed pertinent reports. CXR 5/15/23: bilateral calcified lung nodules with calcified hilar LN's  Pulmonary function testing 7/19/23: restrictive pattern    EKG, Pathology, and Other Studies: I have personally reviewed pertinent reports. Echo 5/22/23: EF 55-60% with mildly dilated RV and PASP of 27mmHg    Sneha Patrick MD  Pulmonary/Critical Care Fellow PGY-V  [facility]      Disclaimer: Portions of the record may have been created with voice recognition software. Occasional wrong word or "sound a like" substitutions may have occurred due to the inherent limitations of voice recognition software. Careful consideration should be taken to recognize, using context, where substitutions have occurred.

## 2024-04-03 ENCOUNTER — OFFICE VISIT (OUTPATIENT)
Dept: PULMONOLOGY | Facility: CLINIC | Age: 74
End: 2024-04-03
Payer: MEDICARE

## 2024-04-03 VITALS
DIASTOLIC BLOOD PRESSURE: 80 MMHG | SYSTOLIC BLOOD PRESSURE: 130 MMHG | HEART RATE: 84 BPM | HEIGHT: 71 IN | BODY MASS INDEX: 34.05 KG/M2 | OXYGEN SATURATION: 97 % | TEMPERATURE: 97.3 F | WEIGHT: 243.2 LBS

## 2024-04-03 DIAGNOSIS — J30.89 SEASONAL ALLERGIC RHINITIS DUE TO OTHER ALLERGIC TRIGGER: ICD-10-CM

## 2024-04-03 DIAGNOSIS — R06.09 DOE (DYSPNEA ON EXERTION): ICD-10-CM

## 2024-04-03 DIAGNOSIS — J45.40 MODERATE PERSISTENT ASTHMA WITHOUT COMPLICATION: Primary | ICD-10-CM

## 2024-04-03 DIAGNOSIS — E66.09 CLASS 1 OBESITY DUE TO EXCESS CALORIES WITHOUT SERIOUS COMORBIDITY WITH BODY MASS INDEX (BMI) OF 33.0 TO 33.9 IN ADULT: ICD-10-CM

## 2024-04-03 PROCEDURE — 99214 OFFICE O/P EST MOD 30 MIN: CPT | Performed by: INTERNAL MEDICINE

## 2024-04-03 RX ORDER — FLUTICASONE FUROATE AND VILANTEROL 100; 25 UG/1; UG/1
1 POWDER RESPIRATORY (INHALATION) DAILY
Qty: 60 BLISTER | Refills: 5 | Status: SHIPPED | OUTPATIENT
Start: 2024-04-03 | End: 2024-05-03

## 2024-04-03 NOTE — PROGRESS NOTES
"Office Progress Note - Pulmonary    Mert Rivera 74 y.o. male MRN: [id number]    Encounter: [id number]      Assessment:  Bronchial asthma.  Allergic rhinitis.  Dyspnea on exertion.  Obesity.    Plan:   Decrease Breo Ellipta to 100/25, 1 inhalation once a day.  Albuterol rescue inhaler 2 inhalations 4 times a day as needed.  Fluticasone nasal spray 2 sprays to each nostril once a day.  Weight loss.  Follow-up in 6 months.    Discussion:   The patient's bronchial asthma is in remission.  I have decreased the Breo Ellipta to 100/25, 1 inhalation once a day.  He will use the albuterol rescue inhaler to inhalations 4 times daily as needed.  We talked about his weight gain.  He is struggling to lose weight.  As the weather is improving he will try to exercise more.  His allergic rhinitis is well treated.  He will use the fluticasone nasal spray 2 sprays to each nostril once a day.  He has a planned cruise trip in the summer.  I have suggested to him to purchase a portable nebulizer and take it with him so he will not need to go to the [facility] on the ship if he gets sick like the last time.  I will see him in 6 months.      Subjective:   The patient is here for a follow-up visit.  He denies any significant cough, wheezing or sputum production.  No nocturnal symptoms.  Denies nasal congestion or postnasal dripping.  He is using Breo Ellipta 200/25, 1 inhalation once a day.  Rarely needs to use his rescue inhaler.  He is using the fluticasone nasal spray 2 sprays to each nostril once a day.  He did not get sick since his last visit in October.    Review of systems:  A 12 point system review is done and aside from what is stated above the rest of the review of systems is negative.      Family history and social history are reviewed.    Medications list is reviewed.      Vitals: Blood pressure 130/80, pulse 84, temperature (!) 97.3 °F (36.3 °C), temperature source Tympanic, height 5' 11\" (1.803 m), weight 110 kg (243 " lb 3.2 oz), SpO2 97%.,     Physical Exam  Gen: Awake, alert, oriented x 3, no acute distress  HEENT: Mucous membranes moist, no oral lesions, no thrush  NECK: No accessory muscle use, JVP not elevated  Cardiac: Regular.  2/6 ejection systolic murmur.  Lungs: Clear breath sounds.  No wheezing or rhonchi  Abdomen: normoactive bowel sounds, soft nontender, nondistended, no rebound or rigidity, no guarding  Extremities: no cyanosis, no clubbing, no edema  Neuro:  Grossly nonfocal.  Skin:  No rash.    Component  Ref Range & Units Value   Hemoglobin  12.5 - 17.0 g/dL 13.3   Hematocrit  37.0 - 48.0 % 40.0   WBC  4.0 - 10.5 thou/cmm 5.0   RBC  4.00 - 5.40 mill/cmm 4.19   Platelet  140 - 350 thou/cmm 227   MPV  7.5 - 11.3 fL 8.5   MCV  80 - 100 fL 95   MCH  27.0 - 36.0 pg 31.8   MCHC  32.0 - 37.0 g/dL 33.3   RDW  12.0 - 16.0 % 13.9   Differential Type AUTO   Absolute Neutrophils  1.8 - 7.8 thou/cmm 3.2   Absolute Lymphocytes  1.0 - 3.0 thou/cmm 1.1   Absolute Monocytes  0.3 - 1.0 thou/cmm 0.4   Absolute Eosinophils  0.0 - 0.5 thou/cmm 0.3   Absolute Basophils  0.0 - 0.1 thou/cmm 0.0   Neutrophils  % 64   Lymphocytes Manual  % 22   Monocytes  % 8   Eosinophils  % 5   Basophils  % 1     Component Value Flag Ref Range Units Status   Glucose 100  High  65 - 99 mg/dL Final   BUN 13  7 - 28 mg/dL Final   Creatinine 1.15  0.53 - 1.30 mg/dL Final   Sodium 141  135 - 145 mmol/L Final   Potassium 4.4  3.5 - 5.2 mmol/L Final   Chloride 103  100 - 109 mmol/L Final   Carbon Dioxide 28  21 - 31 mmol/L Final   Calcium 9.5  8.5 - 10.1 mg/dL Final   Alkaline Phosphatase 43  35 - 120 U/L Final   ALBUMIN 4.4  3.5 - 5.7 g/dL Final   Total Bilirubin 0.6  0.2 - 1.0 mg/dL Final   Comment:   Eltrombopag and its metabolites may interfere with this assay causing erroneously high patient results.   Protein, Total 6.6  6.3 - 8.3 g/dL Final   AST 18  <41 U/L Final   ALT 19  <56 U/L Final   ANION GAP 10  3 - 11  Final   eGFRcr 66  >59  Final   eGFR  Comment Interpretive information: calculated GF

## 2024-04-04 ENCOUNTER — TELEPHONE (OUTPATIENT)
Age: 74
End: 2024-04-04

## 2025-01-16 ENCOUNTER — OFFICE VISIT (OUTPATIENT)
Dept: PULMONOLOGY | Facility: CLINIC | Age: 75
End: 2025-01-16
Payer: MEDICARE

## 2025-01-16 VITALS
HEART RATE: 66 BPM | DIASTOLIC BLOOD PRESSURE: 78 MMHG | WEIGHT: 237 LBS | HEIGHT: 71 IN | BODY MASS INDEX: 33.18 KG/M2 | SYSTOLIC BLOOD PRESSURE: 134 MMHG | TEMPERATURE: 97.6 F | OXYGEN SATURATION: 97 %

## 2025-01-16 DIAGNOSIS — J45.40 MODERATE PERSISTENT ASTHMA WITHOUT COMPLICATION: Primary | ICD-10-CM

## 2025-01-16 DIAGNOSIS — R06.09 DOE (DYSPNEA ON EXERTION): ICD-10-CM

## 2025-01-16 DIAGNOSIS — E66.811 CLASS 1 OBESITY WITHOUT SERIOUS COMORBIDITY WITH BODY MASS INDEX (BMI) OF 33.0 TO 33.9 IN ADULT, UNSPECIFIED OBESITY TYPE: ICD-10-CM

## 2025-01-16 DIAGNOSIS — J30.89 NON-SEASONAL ALLERGIC RHINITIS, UNSPECIFIED TRIGGER: ICD-10-CM

## 2025-01-16 PROCEDURE — 99214 OFFICE O/P EST MOD 30 MIN: CPT | Performed by: INTERNAL MEDICINE

## 2025-01-16 RX ORDER — ALBUTEROL SULFATE 90 UG/1
2 INHALANT RESPIRATORY (INHALATION) EVERY 6 HOURS PRN
Qty: 18 G | Refills: 5 | Status: SHIPPED | OUTPATIENT
Start: 2025-01-16

## 2025-01-16 RX ORDER — BUDESONIDE 0.5 MG/2ML
0.5 INHALANT ORAL 2 TIMES DAILY
Qty: 360 ML | Refills: 3 | Status: SHIPPED | OUTPATIENT
Start: 2025-01-16 | End: 2025-04-16

## 2025-01-16 RX ORDER — ALBUTEROL SULFATE 0.83 MG/ML
2.5 SOLUTION RESPIRATORY (INHALATION) EVERY 6 HOURS PRN
Qty: 240 ML | Refills: 3 | Status: SHIPPED | OUTPATIENT
Start: 2025-01-16 | End: 2025-02-15

## 2025-01-16 RX ORDER — CLINDAMYCIN PHOSPHATE 11.9 MG/ML
1 SOLUTION TOPICAL
COMMUNITY
Start: 2024-10-17

## 2025-01-16 NOTE — PROGRESS NOTES
"Office Progress Note - Pulmonary    Mert Rivera 74 y.o. male MRN: [id number]    Encounter: [id number]      Assessment:  Bronchial asthma.  Allergic rhinitis.  Dyspnea on exertion.  Obesity.    Plan:   Budesonide 0.5 mg nebulized twice a day.  Albuterol rescue inhaler 2 inhalations 4 times a day as needed.  Albuterol nebulizer treatments 4 times a day as needed.  Fluticasone nasal spray 2 sprays to each nostril once a day.  Follow-up in 6 months.    Discussion:   The patient's bronchial asthma is well treated.  I have maintained him on the budesonide 0.5 mg.  I told him to take it twice a day.  He will use the albuterol rescue inhaler 2 inhalations 4 times a day as needed.  Also he can use the albuterol nebulizer treatments 4 times a day when needed.  I have renewed the prescriptions.  The allergic rhinitis is well treated.  I have maintained him on the fluticasone nasal spray 2 sprays to each nostril once a day.  I will see him in 6 months.      Subjective:   The patient is here for follow-up visit.  He denies any significant cough, wheezing or sputum production.  No nocturnal symptoms.  No chest pain.  He is taken the budesonide 0.5 mg once a day.  He is also taken the albuterol rescue inhaler every night.  No significant postnasal dripping.  Using fluticasone nasal spray 2 sprays to each nostril once a day.    Review of systems:  A 12 point system review is done and aside from what is stated above the rest of the review of systems is negative.      Family history and social history are reviewed.    Medications list is reviewed.      Vitals: Blood pressure 134/78, pulse 66, temperature 97.6 °F (36.4 °C), temperature source Tympanic, height 5' 11\" (1.803 m), weight 108 kg (237 lb), SpO2 97%.,     Physical Exam  Gen: Awake, alert, oriented x 3, no acute distress  HEENT: Mucous membranes moist, no oral lesions, no thrush  NECK: No accessory muscle use, JVP not elevated  Cardiac: Regular rate and rhythm.  2/6 " ejection systolic murmur.  Lungs: decreased breath sounds.  Abdomen: normoactive bowel sounds, soft nontender, nondistended, no rebound or rigidity, no guarding  Extremities: no cyanosis, no clubbing, no edema  Neuro:  Grossly nonfocal.  Skin:  No rash.    Component Value Flag Ref Range Units Status   Glucose 102  High  65 - 99 mg/dL Final   BUN 14  7 - 28 mg/dL Final   Creatinine 1.06  0.53 - 1.30 mg/dL Final   Sodium 140  135 - 145 mmol/L Final   Potassium 4.0  3.5 - 5.2 mmol/L Final   Chloride 104  100 - 109 mmol/L Final   Carbon Dioxide 27  21 - 31 mmol/L Final   Calcium 9.4  8.5 - 10.5 mg/dL Final   Comment:   Please note change in the reference range [id number].   Alkaline Phosphatase 47  35 - 120 U/L Final   ALBUMIN 4.3  3.5 - 5.7 g/dL Final   Total Bilirubin 0.7  0.2 - 1.0 mg/dL Final   Comment:   Eltrombopag and its metabolites may interfere with this assay causing erroneously high patient results.   Protein, Total 6.6  6.3 - 8.3 g/dL Final   AST 16  <41 U/L Final   ALT 15  <56 U/L Final   ANION GAP 9  3 - 11  Final   eGFRcr 72  >59  Final   eGFR Comment Interpretive information: calculated GFR    Final   Comment:                                             Units: mL/min per 1.73 square meters  Reported eGFR is based on the CKD-EPI 2021 creatinine equation that does not use a race coefficient and conforms to the NKF-ASN Task Force Recommendations.  Note: Calculated GFR may not be an accurate indicator of renal function if the patient's renal function is not in a steady state.                                            GFR Categories in Chronic Kidney Disease (CKD):  GFR        GFR (mL/min/1.73 square meters)  Category:                   Interpretation:  G1         90 or greater    Normal or high*  G2         60 - 89          Mild decrease*  G3a        45 - 59          Mild to moderate decrease  G3b        30 - 44          Moderate to severe decrease  G4         15 - 29          Severe decrease  G5          14 or less       Kidney failure                                            *In the absence of evidence of kidney damage, neither GFR category G1 or G2 fulfill the criteria for CKD. Kidney Int Suppl 2013, 3: 1-1 50.    Component  Ref Range & Units Value   Hemoglobin  12.5 - 17.0 g/dL 12.8   Hematocrit  37.0 - 48.0 % 37.8   WBC  4.0 - 10.5 thou/cmm 6.1   RBC  4.00 - 5.40 mill/cmm 4.03   Platelet  140 - 350 thou/cmm 247   MPV  7.5 - 11.3 fL 8.5   MCV  80 - 100 fL 94   MCH  27.0 - 36.0 pg 31.8   MCHC  32.0 - 37.0 g/dL 33.8   RDW  12.0 - 16.0 % 14.6   Differential Type AUTO   Absolute Neutrophils  1.8 - 7.8 thou/cmm 4.5   Absolute Lymphocytes  1.0 - 3.0 thou/cmm 1.0   Absolute Monocytes  0.3 - 1.0 thou/cmm 0.4   Absolute Eosinophils  0.0 - 0.5 thou/cmm 0.2   Absolute Basophils  0.0 - 0.1 thou/cmm 0.1   Neutrophils  % 73   Lymphocytes Manual  % 16   Monocytes  % 7   Eosinophils  % 3   Basophils  % 1

## 2025-01-17 ENCOUNTER — TELEPHONE (OUTPATIENT)
Dept: PULMONOLOGY | Facility: CLINIC | Age: 75
End: 2025-01-17

## 2025-02-07 DIAGNOSIS — J45.40 MODERATE PERSISTENT ASTHMA WITHOUT COMPLICATION: ICD-10-CM

## 2025-02-07 RX ORDER — BUDESONIDE 0.5 MG/2ML
INHALANT ORAL
Qty: 360 ML | Refills: 2 | Status: SHIPPED | OUTPATIENT
Start: 2025-02-07